# Patient Record
Sex: MALE | URBAN - NONMETROPOLITAN AREA
[De-identification: names, ages, dates, MRNs, and addresses within clinical notes are randomized per-mention and may not be internally consistent; named-entity substitution may affect disease eponyms.]

---
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
## 2025-05-09 NOTE — PROCEDURE: DIAGNOSIS COMMENT
Recent Visits  Date Type Provider Dept   08/06/24 Office Visit Juanjose Monaco, APRN - CNP [facility]   Showing recent visits within past 540 days with a meds authorizing provider and meeting all other requirements  Future Appointments  No visits were found meeting these conditions.  Showing future appointments within next 150 days with a meds authorizing provider and meeting all other requirements      
Comment: there really was not much to scrape on his face today and this could be partly because he continues to keep a wet towel on his face.  I recommended gentle soaps, and aquaphor for his face.  I talked with Dr. Burgess and he felt it was likely fungal so I told him I can start ketoconazole daily and see if he improves.  I recommended he continue the omnicef just in case it could be erysipelas, less likely.  Pt would not tolerate a koh or a biopsy very well, so, will start the medication and have him f/up Monday.
Detail Level: Simple
encourage PO intake  hold HCTZ for now  monitor